# Patient Record
Sex: MALE | Race: WHITE | NOT HISPANIC OR LATINO | Employment: OTHER | URBAN - METROPOLITAN AREA
[De-identification: names, ages, dates, MRNs, and addresses within clinical notes are randomized per-mention and may not be internally consistent; named-entity substitution may affect disease eponyms.]

---

## 2017-01-31 ENCOUNTER — GENERIC CONVERSION - ENCOUNTER (OUTPATIENT)
Dept: OTHER | Facility: OTHER | Age: 82
End: 2017-01-31

## 2017-02-22 ENCOUNTER — ALLSCRIPTS OFFICE VISIT (OUTPATIENT)
Dept: OTHER | Facility: OTHER | Age: 82
End: 2017-02-22

## 2017-02-22 LAB — HBA1C MFR BLD HPLC: 8 %

## 2017-03-23 ENCOUNTER — GENERIC CONVERSION - ENCOUNTER (OUTPATIENT)
Dept: OTHER | Facility: OTHER | Age: 82
End: 2017-03-23

## 2017-05-24 ENCOUNTER — ALLSCRIPTS OFFICE VISIT (OUTPATIENT)
Dept: OTHER | Facility: OTHER | Age: 82
End: 2017-05-24

## 2017-07-07 ENCOUNTER — GENERIC CONVERSION - ENCOUNTER (OUTPATIENT)
Dept: OTHER | Facility: OTHER | Age: 82
End: 2017-07-07

## 2017-08-04 ENCOUNTER — GENERIC CONVERSION - ENCOUNTER (OUTPATIENT)
Dept: OTHER | Facility: OTHER | Age: 82
End: 2017-08-04

## 2017-08-09 ENCOUNTER — ALLSCRIPTS OFFICE VISIT (OUTPATIENT)
Dept: OTHER | Facility: OTHER | Age: 82
End: 2017-08-09

## 2017-08-09 LAB
GLYCATED HEMOGLOBIN (HISTORICAL): 5.2
GLYCATED HEMOGLOBIN (HISTORICAL): 7.3

## 2017-09-01 DIAGNOSIS — E78.5 HYPERLIPIDEMIA: ICD-10-CM

## 2017-09-01 DIAGNOSIS — E11.65 TYPE 2 DIABETES MELLITUS WITH HYPERGLYCEMIA (HCC): ICD-10-CM

## 2017-09-01 DIAGNOSIS — I10 ESSENTIAL (PRIMARY) HYPERTENSION: ICD-10-CM

## 2017-09-05 ENCOUNTER — HOSPITAL ENCOUNTER (EMERGENCY)
Facility: HOSPITAL | Age: 82
Discharge: HOME | End: 2017-09-06
Attending: EMERGENCY MEDICINE | Admitting: EMERGENCY MEDICINE
Payer: COMMERCIAL

## 2017-09-05 DIAGNOSIS — S09.90XA MINOR HEAD INJURY: ICD-10-CM
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
## 2017-09-06 ENCOUNTER — APPOINTMENT (EMERGENCY)
Dept: CT IMAGING | Facility: HOSPITAL | Age: 82
End: 2017-09-06
Payer: COMMERCIAL

## 2017-09-06 VITALS
DIASTOLIC BLOOD PRESSURE: 63 MMHG | OXYGEN SATURATION: 95 % | HEART RATE: 80 BPM | RESPIRATION RATE: 16 BRPM | WEIGHT: 181.4 LBS | SYSTOLIC BLOOD PRESSURE: 145 MMHG | TEMPERATURE: 98.4 F | BODY MASS INDEX: 26.79 KG/M2

## 2017-09-06 PROCEDURE — 72125 CT NECK SPINE W/O DYE: CPT

## 2017-09-06 PROCEDURE — 99284 EMERGENCY DEPT VISIT MOD MDM: CPT

## 2017-09-06 PROCEDURE — 70450 CT HEAD/BRAIN W/O DYE: CPT

## 2017-09-06 RX ORDER — SULFAMETHOXAZOLE AND TRIMETHOPRIM 800; 160 MG/1; MG/1
1 TABLET ORAL EVERY 12 HOURS SCHEDULED
COMMUNITY
End: 2018-01-15

## 2017-09-06 RX ORDER — ESCITALOPRAM OXALATE 5 MG/1
5 TABLET ORAL DAILY
COMMUNITY

## 2017-09-06 RX ORDER — GABAPENTIN 100 MG/1
100 CAPSULE ORAL 3 TIMES DAILY
COMMUNITY

## 2017-09-06 RX ORDER — LORAZEPAM 0.5 MG/1
0.5 TABLET ORAL DAILY
COMMUNITY

## 2017-09-18 ENCOUNTER — GENERIC CONVERSION - ENCOUNTER (OUTPATIENT)
Dept: OTHER | Facility: OTHER | Age: 82
End: 2017-09-18

## 2017-09-18 ENCOUNTER — ALLSCRIPTS OFFICE VISIT (OUTPATIENT)
Dept: OTHER | Facility: OTHER | Age: 82
End: 2017-09-18

## 2017-09-26 ENCOUNTER — GENERIC CONVERSION - ENCOUNTER (OUTPATIENT)
Dept: OTHER | Facility: OTHER | Age: 82
End: 2017-09-26

## 2017-10-03 ENCOUNTER — GENERIC CONVERSION - ENCOUNTER (OUTPATIENT)
Dept: OTHER | Facility: OTHER | Age: 82
End: 2017-10-03

## 2017-10-19 ENCOUNTER — GENERIC CONVERSION - ENCOUNTER (OUTPATIENT)
Dept: OTHER | Facility: OTHER | Age: 82
End: 2017-10-19

## 2017-11-03 DIAGNOSIS — R31.9 HEMATURIA: ICD-10-CM

## 2017-11-16 ENCOUNTER — GENERIC CONVERSION - ENCOUNTER (OUTPATIENT)
Dept: OTHER | Facility: OTHER | Age: 82
End: 2017-11-16

## 2018-01-13 VITALS
BODY MASS INDEX: 24.11 KG/M2 | DIASTOLIC BLOOD PRESSURE: 58 MMHG | WEIGHT: 168.38 LBS | SYSTOLIC BLOOD PRESSURE: 102 MMHG | HEIGHT: 70 IN

## 2018-01-13 VITALS
DIASTOLIC BLOOD PRESSURE: 60 MMHG | BODY MASS INDEX: 26.72 KG/M2 | WEIGHT: 186.25 LBS | SYSTOLIC BLOOD PRESSURE: 110 MMHG

## 2018-01-14 VITALS — SYSTOLIC BLOOD PRESSURE: 104 MMHG | DIASTOLIC BLOOD PRESSURE: 60 MMHG

## 2018-01-15 ENCOUNTER — APPOINTMENT (EMERGENCY)
Dept: RADIOLOGY | Facility: HOSPITAL | Age: 83
End: 2018-01-15
Payer: COMMERCIAL

## 2018-01-15 ENCOUNTER — APPOINTMENT (EMERGENCY)
Dept: CT IMAGING | Facility: HOSPITAL | Age: 83
End: 2018-01-15
Payer: COMMERCIAL

## 2018-01-15 ENCOUNTER — HOSPITAL ENCOUNTER (EMERGENCY)
Facility: HOSPITAL | Age: 83
Discharge: HOME | End: 2018-01-16
Attending: EMERGENCY MEDICINE | Admitting: EMERGENCY MEDICINE
Payer: COMMERCIAL

## 2018-01-15 DIAGNOSIS — S22.080A T12 COMPRESSION FRACTURE (HCC): ICD-10-CM

## 2018-01-15 DIAGNOSIS — I48.91 ATRIAL FIBRILLATION (HCC): ICD-10-CM

## 2018-01-15 DIAGNOSIS — M54.9 BACK PAIN: Primary | ICD-10-CM

## 2018-01-15 LAB
ALBUMIN SERPL BCP-MCNC: 3.1 G/DL (ref 3.5–5)
ALP SERPL-CCNC: 140 U/L (ref 46–116)
ALT SERPL W P-5'-P-CCNC: 14 U/L (ref 12–78)
ANION GAP SERPL CALCULATED.3IONS-SCNC: 5 MMOL/L (ref 4–13)
AST SERPL W P-5'-P-CCNC: 13 U/L (ref 5–45)
BACTERIA UR QL AUTO: ABNORMAL /HPF
BASOPHILS # BLD AUTO: 0.02 THOUSANDS/ΜL (ref 0–0.1)
BASOPHILS NFR BLD AUTO: 0 % (ref 0–1)
BILIRUB SERPL-MCNC: 1.41 MG/DL (ref 0.2–1)
BILIRUB UR QL STRIP: NEGATIVE
BUN SERPL-MCNC: 12 MG/DL (ref 5–25)
CALCIUM SERPL-MCNC: 9.2 MG/DL (ref 8.3–10.1)
CHLORIDE SERPL-SCNC: 100 MMOL/L (ref 100–108)
CLARITY UR: CLEAR
CO2 SERPL-SCNC: 30 MMOL/L (ref 21–32)
COLOR UR: YELLOW
COLOR, POC: YELLOW
CREAT SERPL-MCNC: 1.13 MG/DL (ref 0.6–1.3)
EOSINOPHIL # BLD AUTO: 0.09 THOUSAND/ΜL (ref 0–0.61)
EOSINOPHIL NFR BLD AUTO: 1 % (ref 0–6)
ERYTHROCYTE [DISTWIDTH] IN BLOOD BY AUTOMATED COUNT: 13.4 % (ref 11.6–15.1)
GFR SERPL CREATININE-BSD FRML MDRD: 56 ML/MIN/1.73SQ M
GLUCOSE SERPL-MCNC: 473 MG/DL (ref 65–140)
GLUCOSE UR STRIP-MCNC: ABNORMAL MG/DL
HCT VFR BLD AUTO: 47 % (ref 36.5–49.3)
HGB BLD-MCNC: 16.7 G/DL (ref 12–17)
HGB UR QL STRIP.AUTO: ABNORMAL
KETONES UR STRIP-MCNC: ABNORMAL MG/DL
LEUKOCYTE ESTERASE UR QL STRIP: ABNORMAL
LYMPHOCYTES # BLD AUTO: 1.09 THOUSANDS/ΜL (ref 0.6–4.47)
LYMPHOCYTES NFR BLD AUTO: 13 % (ref 14–44)
MCH RBC QN AUTO: 30.9 PG (ref 26.8–34.3)
MCHC RBC AUTO-ENTMCNC: 35.5 G/DL (ref 31.4–37.4)
MCV RBC AUTO: 87 FL (ref 82–98)
MONOCYTES # BLD AUTO: 0.52 THOUSAND/ΜL (ref 0.17–1.22)
MONOCYTES NFR BLD AUTO: 6 % (ref 4–12)
NEUTROPHILS # BLD AUTO: 6.55 THOUSANDS/ΜL (ref 1.85–7.62)
NEUTS SEG NFR BLD AUTO: 80 % (ref 43–75)
NITRITE UR QL STRIP: NEGATIVE
NON-SQ EPI CELLS URNS QL MICRO: ABNORMAL /HPF
NRBC BLD AUTO-RTO: 0 /100 WBCS
PH UR STRIP.AUTO: 7.5 [PH] (ref 4.5–8)
PLATELET # BLD AUTO: 132 THOUSANDS/UL (ref 149–390)
PMV BLD AUTO: 11 FL (ref 8.9–12.7)
POTASSIUM SERPL-SCNC: 4.5 MMOL/L (ref 3.5–5.3)
PROT SERPL-MCNC: 7 G/DL (ref 6.4–8.2)
PROT UR STRIP-MCNC: ABNORMAL MG/DL
RBC # BLD AUTO: 5.41 MILLION/UL (ref 3.88–5.62)
RBC #/AREA URNS AUTO: ABNORMAL /HPF
SODIUM SERPL-SCNC: 135 MMOL/L (ref 136–145)
SP GR UR STRIP.AUTO: 1.01 (ref 1–1.03)
SPECIMEN SOURCE: NORMAL
TROPONIN I BLD-MCNC: 0.01 NG/ML (ref 0–0.08)
UROBILINOGEN UR QL STRIP.AUTO: 2 E.U./DL
WBC # BLD AUTO: 8.27 THOUSAND/UL (ref 4.31–10.16)
WBC #/AREA URNS AUTO: ABNORMAL /HPF

## 2018-01-15 PROCEDURE — 93005 ELECTROCARDIOGRAM TRACING: CPT

## 2018-01-15 PROCEDURE — 96360 HYDRATION IV INFUSION INIT: CPT

## 2018-01-15 PROCEDURE — 96361 HYDRATE IV INFUSION ADD-ON: CPT

## 2018-01-15 PROCEDURE — 70450 CT HEAD/BRAIN W/O DYE: CPT

## 2018-01-15 PROCEDURE — 85025 COMPLETE CBC W/AUTO DIFF WBC: CPT | Performed by: EMERGENCY MEDICINE

## 2018-01-15 PROCEDURE — 81002 URINALYSIS NONAUTO W/O SCOPE: CPT | Performed by: EMERGENCY MEDICINE

## 2018-01-15 PROCEDURE — 73502 X-RAY EXAM HIP UNI 2-3 VIEWS: CPT

## 2018-01-15 PROCEDURE — 84484 ASSAY OF TROPONIN QUANT: CPT

## 2018-01-15 PROCEDURE — 72125 CT NECK SPINE W/O DYE: CPT

## 2018-01-15 PROCEDURE — 81001 URINALYSIS AUTO W/SCOPE: CPT

## 2018-01-15 PROCEDURE — 87086 URINE CULTURE/COLONY COUNT: CPT

## 2018-01-15 PROCEDURE — 36415 COLL VENOUS BLD VENIPUNCTURE: CPT | Performed by: EMERGENCY MEDICINE

## 2018-01-15 PROCEDURE — 87077 CULTURE AEROBIC IDENTIFY: CPT

## 2018-01-15 PROCEDURE — 87186 SC STD MICRODIL/AGAR DIL: CPT

## 2018-01-15 PROCEDURE — 72100 X-RAY EXAM L-S SPINE 2/3 VWS: CPT

## 2018-01-15 PROCEDURE — 80053 COMPREHEN METABOLIC PANEL: CPT | Performed by: EMERGENCY MEDICINE

## 2018-01-15 RX ORDER — TRAMADOL HYDROCHLORIDE 50 MG/1
50 TABLET ORAL EVERY 6 HOURS PRN
Qty: 15 TABLET | Refills: 0 | Status: SHIPPED | OUTPATIENT
Start: 2018-01-15 | End: 2018-01-25

## 2018-01-15 RX ORDER — DIPHENOXYLATE HYDROCHLORIDE AND ATROPINE SULFATE 2.5; .025 MG/1; MG/1
1 TABLET ORAL 4 TIMES DAILY PRN
COMMUNITY

## 2018-01-15 RX ORDER — ONDANSETRON 4 MG/1
4 TABLET, FILM COATED ORAL EVERY 6 HOURS PRN
COMMUNITY

## 2018-01-15 RX ORDER — ISOSORBIDE DINITRATE 20 MG/1
20 TABLET ORAL 2 TIMES DAILY
COMMUNITY

## 2018-01-15 RX ORDER — ALBUTEROL SULFATE 2.5 MG/3ML
2.5 SOLUTION RESPIRATORY (INHALATION) EVERY 4 HOURS PRN
COMMUNITY

## 2018-01-15 RX ORDER — BISACODYL 10 MG
10 SUPPOSITORY, RECTAL RECTAL AS NEEDED
COMMUNITY

## 2018-01-15 RX ORDER — LISINOPRIL 10 MG/1
10 TABLET ORAL 2 TIMES DAILY
COMMUNITY

## 2018-01-15 RX ADMIN — SODIUM CHLORIDE 1000 ML: 0.9 INJECTION, SOLUTION INTRAVENOUS at 16:47

## 2018-01-15 NOTE — ED PROVIDER NOTES
History  Chief Complaint   Patient presents with    Back Pain     Patient arrives from nursing home with complaints of back pain  Patient is [place] speaking only but does point to lower back and head stating he has pain there  Pain not from any type of injury according to staff but patient does have a history of falls  51-year-old male from a nursing home with a history of CAD, dementia, subdural hematoma, diabetes, hypertension presents to the emergency department for evaluation of back pain  Nursing staff is uncertain when this started or if there was a fall  Patient does have history of falls but apparently he was in his bed when he started complaining of the back pain  Unfortunately, patient has significant dementia and only speaks [place] so history is unobtainable from the patient  On arrival to the emergency department he does not seem to be in any acute distress but does yell when being moved  He points to his head and his back when asked about pain        History provided by:  EMS personnel and nursing home  History limited by:  Dementia  Back Pain   Location:  Lumbar spine  Quality:  Unable to specify  Pain severity:  Unable to specify  Pain is:  Unable to specify  Onset quality:  Unable to specify  Timing:  Unable to specify  Chronicity:  New  Context comment:  Uncertain of injury  Relieved by:  None tried  Worsened by: Movement  Ineffective treatments:  None tried  Associated symptoms comment:  Unable to provide further symptoms or history  Risk factors: no hx of cancer, not obese and no recent surgery        Prior to Admission Medications   Prescriptions Last Dose Informant Patient Reported? Taking?    Acetaminophen 500 MG   Yes Yes   Sig: Take by mouth every 8 (eight) hours as needed for mild pain   LORazepam (ATIVAN) 0 5 mg tablet   Yes Yes   Sig: Take 0 5 mg by mouth daily     albuterol (2 5 mg/3 mL) 0 083 % nebulizer solution   Yes Yes   Sig: Take 2 5 mg by nebulization every 4 (four) hours as needed for wheezing   bisacodyl (BISAC-EVAC) 10 mg suppository   Yes Yes   Sig: Insert 10 mg into the rectum as needed for constipation   diphenoxylate-atropine (LOMOTIL) 2 5-0 025 mg per tablet   Yes Yes   Sig: Take 1 tablet by mouth 4 (four) times a day as needed for diarrhea   escitalopram (LEXAPRO) 5 mg tablet   Yes Yes   Sig: Take 5 mg by mouth daily   ferrous sulfate 325 (65 Fe) mg tablet   Yes Yes   Sig: Take 325 mg by mouth daily     gabapentin (NEURONTIN) 100 mg capsule   Yes Yes   Sig: Take 100 mg by mouth 3 (three) times a day   glimepiride (AMARYL) 4 mg tablet   Yes Yes   Sig: Take 4 mg by mouth 2 (two) times a day Take after dinner or at bedtime    isosorbide dinitrate (ISORDIL) 20 mg tablet   Yes Yes   Sig: Take 20 mg by mouth 2 (two) times a day   lisinopril (ZESTRIL) 10 mg tablet   Yes Yes   Sig: Take 10 mg by mouth 2 (two) times a day   metoprolol tartrate (LOPRESSOR) 25 mg tablet   Yes Yes   Sig: Take 25 mg by mouth 2 (two) times a day     ondansetron (ZOFRAN) 4 mg tablet   Yes Yes   Sig: Take 4 mg by mouth every 6 (six) hours as needed for nausea or vomiting      Facility-Administered Medications: None       Past Medical History:   Diagnosis Date    BPH (benign prostatic hyperplasia)     CAD (coronary artery disease)     s/p stent    Chronic indwelling Umanzor catheter     [facility] cancer ([facility] )     jan 2013    Dementia     Dementia     Diabetes ([facility] )     Dyslipidemia     Falls     GERD (gastroesophageal reflux disease)     Hyperlipidemia     Hypertension     Subdural hemorrhage (HCC)        Past Surgical History:   Procedure Laterality Date    COLECTOMY      jan 2013       Family History   Problem Relation Age of Onset    Coronary artery disease Other      I have reviewed and agree with the history as documented      Social History   Substance Use Topics    Smoking status: Former Smoker    Smokeless tobacco: Not on file    Alcohol use No        Review of Systems   Unable to perform ROS: Dementia   Musculoskeletal: Positive for back pain  Physical Exam  ED Triage Vitals   Temperature Pulse Respirations Blood Pressure SpO2   01/15/18 1438 01/15/18 1438 01/15/18 1438 01/15/18 1438 01/15/18 1438   98 1 °F (36 7 °C) 82 16 (!) 181/93 95 %      Temp Source Heart Rate Source Patient Position - Orthostatic VS BP Location FiO2 (%)   01/15/18 1438 01/15/18 1438 01/15/18 1715 01/15/18 1715 --   Oral Monitor Lying Right arm       Pain Score       01/15/18 1438       7           Orthostatic Vital Signs  Vitals:    01/15/18 1715 01/15/18 1802 01/15/18 2229 01/16/18 0034   BP: (!) 140/106 140/89 128/80 (!) 171/83   Pulse: (!) 107  100 (!) 118   Patient Position - Orthostatic VS: Lying  Lying Lying       Physical Exam   Constitutional: He appears well-developed and well-nourished  Non-toxic appearance  He does not have a sickly appearance  He does not appear ill  No distress  HENT:   Head: Normocephalic and atraumatic  Right Ear: External ear normal    Left Ear: External ear normal    Mouth/Throat: Oropharynx is clear and moist  Mucous membranes are dry  Eyes: Conjunctivae and EOM are normal  Pupils are equal, round, and reactive to light  No scleral icterus  Neck: Normal range of motion  Neck supple  No spinous process tenderness present  Cardiovascular: Normal rate  An irregularly irregular rhythm present  No murmur heard  Pulmonary/Chest: Effort normal and breath sounds normal    Abdominal: Soft  Bowel sounds are normal  He exhibits no distension and no mass  There is no tenderness  No hernia  Musculoskeletal: He exhibits no edema or deformity  Left hip: He exhibits tenderness  He exhibits normal range of motion, no bony tenderness, no swelling, no crepitus, no deformity and no laceration  Lumbar back: He exhibits tenderness, bony tenderness and pain  He exhibits normal range of motion, no swelling, no edema, no deformity and no laceration  Neurological: He is alert  He has normal strength and normal reflexes  He is disoriented  He displays no tremor  He exhibits normal muscle tone  Skin: Skin is warm and dry  No rash noted  He is not diaphoretic  No erythema  No pallor  Psychiatric: He has a normal mood and affect  Nursing note and vitals reviewed        ED Medications  Medications   sodium chloride 0 9 % bolus 1,000 mL (0 mL Intravenous Stopped 1/15/18 1823)       Diagnostic Studies  Results Reviewed     Procedure Component Value Units Date/Time    Urine culture [[id number]]  (Abnormal)  (Susceptibility) Collected:  01/15/18 1631    Lab Status:  Final result Specimen:  Urine from Urine, Clean Catch Updated:  01/19/18 1531     Urine Culture >100,000 cfu/ml Klebsiella pneumoniae (A)      10,000-19,000 cfu/ml Citrobacter freundii (A)    Susceptibility      Klebsiella pneumoniae     THAI    Ampicillin ($$) <=8 00 ug/ml Resistant    Ampicillin + Sulbactam ($) <=8/4 ug/ml Susceptible    Aztreonam ($$$)  <=8 ug/ml Susceptible    Cefazolin ($) <=8 00 ug/ml Susceptible    Ciprofloxacin ($)  <=1 00 ug/ml Susceptible    Gentamicin ($$) <=4 ug/ml Susceptible    Levofloxacin ($) <=2 00 ug/ml Susceptible    Nitrofurantoin 64 ug/ml Intermediate    Piperacillin + Tazobactam ($$$) <=16 ug/ml Susceptible    Tetracycline <=4 ug/ml Susceptible    Tobramycin ($) <=4 ug/ml Susceptible    Trimethoprim + Sulfamethoxazole ($$$) <=2/38 ug/ml Susceptible               Susceptibility      Citrobacter freundii     THAI    Amoxicillin + Clavulanate <=8/4 ug/ml Resistant    Ampicillin ($$) >16 00 ug/ml Resistant    Ampicillin + Sulbactam ($) <=8/4 ug/ml Resistant    Aztreonam ($$$)  <=8 ug/ml Susceptible    Cefazolin ($) <=8 00 ug/ml Resistant    Cefotaxime ($) <=2 00 ug/ml Susceptible    Ceftazidime ($$) <=1 ug/ml Susceptible    Ceftriaxone ($$) <=8 00 ug/ml Susceptible    Cefuroxime ($$) <=4 ug/ml Resistant    Ciprofloxacin ($)  <=1 00 ug/ml Susceptible    Ertapenem ($$$) <=2 0 ug/ml Susceptible Gentamicin ($$) <=4 ug/ml Susceptible    Imipenem <=4 ug/ml Susceptible    Levofloxacin ($) <=2 00 ug/ml Susceptible    Meropenem ($$) <=4 00 ug/ml Susceptible    Nitrofurantoin <=32 ug/ml Susceptible    Piperacillin + Tazobactam ($$$) <=16 ug/ml Susceptible    Tetracycline <=4 ug/ml Susceptible    Tobramycin ($) <=4 ug/ml Susceptible    Trimethoprim + Sulfamethoxazole ($$$) >2/38 ug/ml Resistant                   Urine Microscopic [[id number]]  (Abnormal) Collected:  01/15/18 1631    Lab Status:  Final result Specimen:  Urine from Urine, Clean Catch Updated:  01/15/18 1720     RBC, UA None Seen /hpf      WBC, UA Innumerable (A) /hpf      Epithelial Cells None Seen /hpf      Bacteria, UA None Seen /hpf     POCT urinalysis dipstick [[id number]]  (Normal) Resulted:  01/15/18 1634    Lab Status:  Final result Specimen:  Urine Updated:  01/15/18 1634     Color, UA yellow    ED Urine Macroscopic [[id number]]  (Abnormal) Collected:  01/15/18 1631    Lab Status:  Final result Specimen:  Urine Updated:  01/15/18 1633     Color, UA Yellow     Clarity, UA Clear     pH, UA 7 5     Leukocytes, UA Trace (A)     Nitrite, UA Negative     Protein, UA Trace (A) mg/dl      Glucose,  (1/4%) (A) mg/dl      Ketones, UA 40 (2+) (A) mg/dl      Urobilinogen, UA 2 0 (A) E U /dl      Bilirubin, UA Negative     Blood, UA Small (A)     Specific [place], UA 1 015    Narrative:       CLINITEK RESULT    Comprehensive metabolic panel [[id number]]  (Abnormal) Collected:  01/15/18 1500    Lab Status:  Final result Specimen:  Blood from Arm, Left Updated:  01/15/18 1524     Sodium 135 (L) mmol/L      Potassium 4 5 mmol/L      Chloride 100 mmol/L      CO2 30 mmol/L      Anion Gap 5 mmol/L      BUN 12 mg/dL      Creatinine 1 13 mg/dL      Glucose 473 (H) mg/dL      Calcium 9 2 mg/dL      AST 13 U/L      ALT 14 U/L      Alkaline Phosphatase 140 (H) U/L      Total Protein 7 0 g/dL      Albumin 3 1 (L) g/dL      Total Bilirubin 1 41 (H) mg/dL      eGFR 56 ml/min/1 73sq m     Narrative:         National Kidney Disease Education Program recommendations are as follows:  GFR calculation is accurate only with a steady state creatinine  Chronic Kidney disease less than 60 ml/min/1 73 sq  meters  Kidney failure less than 15 ml/min/1 73 sq  meters  POCT troponin [[id number]]  (Normal) Collected:  01/15/18 1508    Lab Status:  Final result Updated:  01/15/18 1522     POC Troponin I 0 01 ng/ml      Specimen Type VENOUS    Narrative:         [employer] i-Stat handheld analyzer 99% cutoff is > 0 08ng/mL in [facility] Emergency Departments    o cTnI 99% cutoff is useful only when applied to patients in the clinical setting of myocardial ischemia  o cTnI 99% cutoff should be interpreted in the context of clinical history, ECG findings and possibly cardiac imaging to establish correct diagnosis  o cTnI 99% cutoff may be suggestive but clearly not indicative of a coronary event without the clinical setting of myocardial ischemia  CBC and differential [[id number]]  (Abnormal) Collected:  01/15/18 1500    Lab Status:  Final result Specimen:  Blood from Arm, Left Updated:  01/15/18 1508     WBC 8 27 Thousand/uL      RBC 5 41 Million/uL      Hemoglobin 16 7 g/dL      Hematocrit 47 0 %      MCV 87 fL      MCH 30 9 pg      MCHC 35 5 g/dL      RDW 13 4 %      MPV 11 0 fL      Platelets 948 (L) Thousands/uL      nRBC 0 /100 WBCs      Neutrophils Relative 80 (H) %      Lymphocytes Relative 13 (L) %      Monocytes Relative 6 %      Eosinophils Relative 1 %      Basophils Relative 0 %      Neutrophils Absolute 6 55 Thousands/µL      Lymphocytes Absolute 1 09 Thousands/µL      Monocytes Absolute 0 52 Thousand/µL      Eosinophils Absolute 0 09 Thousand/µL      Basophils Absolute 0 02 Thousands/µL                  CT cervical spine without contrast   Final Result by Danette James MD (01/15 1624)      No cervical spine fracture or traumatic malalignment                           Workstation performed: [id number]         CT head without contrast   Final Result by Zachery Lou MD (01/15 0413)   Advanced chronic microvascular ischemic disease      Age-related atrophy      No acute hemorrhage or mass effect      Postoperative changes      Findings are stable         Workstation performed: [id number]         XR hip/pelv 2-3 vws left   Final Result by Tristen Munguia DO (01/15 6087)   No acute osseous abnormality  Consider follow-up MRI if there is strong clinical suspicion of a significant microtrabecular bone injury or occult fracture  Workstation performed: [id number]         XR lumbar spine 2 or 3 views   Final Result by Zachery Lou MD (01/15 9079)   Moderate multilevel degenerative spondylosis with mild interval progression      Diffuse osteopenia      Mild T12 endplate compression, uncertain chronicity, likely chronic      Straightening of normal lordosis suspicious for muscle spasm         Workstation performed: [id number]                    Procedures  Procedures       Phone Contacts  ED Phone Contact    ED Course  ED Course as of Jan 19 1536   Mon Lamine 15, 2018   1635  Spoke with Cardiology regarding atrial fibrillation not previously documented  Given the patient's age and history of frequent falls would only recommend enteric-coated baby aspirin  I did speak with the family and patient's wife states that the family physician had taken the patient off of aspirin  I did recommend that the nursing home speak with the family physician regarding the atrial fibrillation to see if he would like to restart the patient on aspirin  The patient was updated on x-ray reports thus far in the possibility of an age indeterminate compression fracture as the cause of his pain  Urinalysis does show some ketones and patient did look dehydrated so will treat with some IV fluids    He will most likely be discharged back to the nursing home                                MDM  Number of Diagnoses or Management Options  Diagnosis management comments: 54-year-old male presents from a nursing home complaining back pain  Nursing staff is unsure the patient fell  He does have a history of falls but he was found in his bed when he was started to complain of back pain  On arrival patient is awake and in no distress  He does seem to have some pain when moved and points to his back and his head  He also seems to be in atrial fibrillation which is new as there is no record of this on his past charts or records from the nursing home  His mouth is dry  He does have irregular heartbeat on exam   He does have some mild tenderness across the low back but no sign of external trauma  He is holding his left hip in a flexed position but I am able to straighten it out any does not seem to have much pain  Secondary to patient being confused, speaking a foreign language and no definite history from the nursing home, will CT head, C-spine to rule out trauma  Will also x-ray lumbar spine  Will x-ray left hip    Will do basic labs secondary to atrial fibrillation       Amount and/or Complexity of Data Reviewed  Clinical lab tests: ordered and reviewed  Tests in the radiology section of CPT®: ordered and reviewed  Decide to obtain previous medical records or to obtain history from someone other than the patient: yes  Review and summarize past medical records: yes  Independent visualization of images, tracings, or specimens: yes      CritCare Time    Disposition  Final diagnoses:   Back pain   T12 compression fracture ([facility] [address] )   Atrial fibrillation ([facility] [address] )     Time reflects when diagnosis was documented in both MDM as applicable and the Disposition within this note     Time User Action Codes Description Comment    1/15/2018  4:37 PM Clydene Sago A Add [M54 9] Back pain     1/15/2018  4:37 PM Clydene Sago A Add [S22 080A] T12 compression fracture ([address] )     1/15/2018  4:39 PM Clydene Sago A Add [I48 91] Atrial fibrillation ([facility] [address] ) ED Disposition     ED Disposition Condition Comment    Discharge  [facility] discharge to home/self care  Condition at discharge: Good        Follow-up Information     Follow up With Specialties Details Why Contact Info    Tyson Bearden DO Family Medicine Schedule an appointment as soon as possible for a visit in 2 days  [id number]    [address] [facility]  [phone]          Discharge Medication List as of 1/15/2018  4:39 PM      START taking these medications    Details   traMADol (ULTRAM) 50 mg tablet Take 1 tablet by mouth every 6 (six) hours as needed for moderate pain for up to 10 days, Starting Mon 1/15/2018, Until Thu 1/25/2018, Print         CONTINUE these medications which have NOT CHANGED    Details   Acetaminophen 500 MG Take by mouth every 8 (eight) hours as needed for mild pain, Historical Med      albuterol (2 5 mg/3 mL) 0 083 % nebulizer solution Take 2 5 mg by nebulization every 4 (four) hours as needed for wheezing, Historical Med      bisacodyl (BISAC-EVAC) 10 mg suppository Insert 10 mg into the rectum as needed for constipation, Historical Med      diphenoxylate-atropine (LOMOTIL) 2 5-0 025 mg per tablet Take 1 tablet by mouth 4 (four) times a day as needed for diarrhea, Historical Med      escitalopram (LEXAPRO) 5 mg tablet Take 5 mg by mouth daily, Historical Med      ferrous sulfate 325 (65 Fe) mg tablet Take 325 mg by mouth daily  , Historical Med      gabapentin (NEURONTIN) 100 mg capsule Take 100 mg by mouth 3 (three) times a day, Historical Med      glimepiride (AMARYL) 4 mg tablet Take 4 mg by mouth 2 (two) times a day Take after dinner or at bedtime , Historical Med      isosorbide dinitrate (ISORDIL) 20 mg tablet Take 20 mg by mouth 2 (two) times a day, Historical Med      lisinopril (ZESTRIL) 10 mg tablet Take 10 mg by mouth 2 (two) times a day, Historical Med      LORazepam (ATIVAN) 0 5 mg tablet Take 0 5 mg by mouth daily  , Historical Med metoprolol tartrate (LOPRESSOR) 25 mg tablet Take 25 mg by mouth 2 (two) times a day  , Until Discontinued, Historical Med      ondansetron (ZOFRAN) 4 mg tablet Take 4 mg by mouth every 6 (six) hours as needed for nausea or vomiting, Historical Med           No discharge procedures on file      ED Provider  Electronically Signed by           Naif Mason DO  01/19/18 5946

## 2018-01-15 NOTE — ED PROCEDURE NOTE
PROCEDURE  ECG 12 Lead Documentation  Date/Time: 1/15/2018 2:50 PM  Performed by: Cosme Oliva  Authorized by: Cosme Oliva     ECG reviewed by me, the ED Provider: yes    Patient location:  ED  Interpretation:     Interpretation: abnormal    Rate:     ECG rate assessment: normal    Rhythm:     Rhythm: atrial fibrillation    Ectopy:     Ectopy: PVCs      PVCs:  Infrequent  Conduction:     Conduction: abnormal      Abnormal conduction: complete RBBB    ST segments:     ST segments:  Normal  T waves:     T waves: normal           Brenden Sun DO  01/15/18 1451

## 2018-01-15 NOTE — ED NOTES
Pt continues to bend arm occluding IV infusion, unable to follow verbal commands, arm board applied at this time        Chuck Leal RN  01/15/18 1800

## 2018-01-15 NOTE — ED NOTES
Pt extremely difficult to understand due to mumbled speech and language barrier, pt poor historian with vague complaints        Harjinder Lynn, RN  01/15/18 0891

## 2018-01-15 NOTE — DISCHARGE INSTRUCTIONS
A-fib (Atrial Fibrillation)   WHAT YOU NEED TO KNOW:   A-fib may come and go, or it may be a long-term condition  A-fib can cause blood clots, stroke, or heart failure  These conditions may become life-threatening  It is important to treat and manage a-fib to help prevent a blood clot, stroke, or heart failure  DISCHARGE INSTRUCTIONS:   Call 911 for any of the following:   · You have any of the following signs of a heart attack:      ¨ Squeezing, pressure, or pain in your chest that lasts longer than 5 minutes or returns    ¨ Discomfort or pain in your back, neck, jaw, stomach, or arm     ¨ Trouble breathing    ¨ Nausea or vomiting    ¨ Lightheadedness or a sudden cold sweat, especially with chest pain or trouble breathing    · You have any of the following signs of a stroke:      ¨ Numbness or drooping on one side of your face     ¨ Weakness in an arm or leg    ¨ Confusion or difficulty speaking    ¨ Dizziness, a severe headache, or vision loss  Return to the emergency department if:  You have any of the following signs of a blood clot:  · You feel lightheaded, are short of breath, and have chest pain  · You cough up blood  · You have swelling, redness, pain, or warmth in your arm or leg  Contact your cardiologist or healthcare provider if:   · Your target heart rate is not in the range it should be  · You have new or worsening swelling in your legs, feet, ankles, or abdomen  · You are short of breath, even at rest      · You have questions or concerns about your condition or care  Medicines: You may need any of the following:  · Heart medicines  help control your heart rate and rhythm  You may need more than one medicine to treat your symptoms  · Blood thinners    help prevent blood clots  Examples of blood thinners include heparin and warfarin  Clots can cause strokes, heart attacks, and death   The following are general safety guidelines to follow while you are taking a blood thinner:    ¨ Watch for bleeding and bruising while you take blood thinners  Watch for bleeding from your gums or nose  Watch for blood in your urine and bowel movements  Use a soft washcloth on your skin, and a soft toothbrush to brush your teeth  This can keep your skin and gums from bleeding  If you shave, use an electric shaver  Do not play contact sports  ¨ Tell your dentist and other healthcare providers that you take anticoagulants  Wear a bracelet or necklace that says you take this medicine  ¨ Do not start or stop any medicines unless your healthcare provider tells you to  Many medicines cannot be used with blood thinners  ¨ Tell your healthcare provider right away if you forget to take the medicine, or if you take too much  ¨ Warfarin  is a blood thinner that you may need to take  The following are things you should be aware of if you take warfarin  § Foods and medicines can affect the amount of warfarin in your blood  Do not make major changes to your diet while you take warfarin  Warfarin works best when you eat about the same amount of vitamin K every day  Vitamin K is found in green leafy vegetables and certain other foods  Ask for more information about what to eat when you are taking warfarin  § You will need to see your healthcare provider for follow-up visits when you are on warfarin  You will need regular blood tests  These tests are used to decide how much medicine you need  · Antiplatelets , such as aspirin, help prevent blood clots  Take your antiplatelet medicine exactly as directed  These medicines make it more likely for you to bleed or bruise  If you are told to take aspirin, do not take acetaminophen or ibuprofen instead  · Take your medicine as directed  Contact your healthcare provider if you think your medicine is not helping or if you have side effects  Tell him or her if you are allergic to any medicine   Keep a list of the medicines, vitamins, and herbs you take  Include the amounts, and when and why you take them  Bring the list or the pill bottles to follow-up visits  Carry your medicine list with you in case of an emergency  Follow up with your cardiologist as directed: You will need regular blood tests and monitoring  Write down your questions so you remember to ask them during your visits  Manage A-fib:   · Know your target heart rate  Learn how to take your pulse and monitor your heart rate  · Manage other health conditions  This includes high blood pressure, sleep apnea, thyroid disease, diabetes, and other heart conditions  Take medicine as directed and follow your treatment plan  · Limit or do not drink alcohol  Alcohol can make a-fib hard to manage  Ask your healthcare provider if it is safe for you to drink alcohol  A drink of alcohol is 12 ounces of beer, 5 ounces of wine, or 1½ ounces of liquor  · Do not smoke  Nicotine and other chemicals in cigarettes and cigars can cause heart and lung damage  Ask your healthcare provider for information if you currently smoke and need help to quit  E-cigarettes or smokeless tobacco still contain nicotine  Talk to your healthcare provider before you use these products  · Eat heart-healthy foods  Heart healthy foods will help keep your cholesterol low  These include fruits, vegetables, whole-grain breads, low-fat dairy products, beans, lean meats, and fish  Replace butter and margarine with heart-healthy oils such as olive oil and canola oil  · Maintain a healthy weight  Ask your healthcare provider how much you should weigh  Ask him to help you create a weight loss plan if you are overweight  · Exercise for 30 minutes  most days of the week  Ask your healthcare provider about the best exercise plan for you  © 2017 [employer] Information is for End User's use only and may not be sold, redistributed or otherwise used for commercial purposes   All illustrations and images included in CareNotes® are the copyrighted property of [employer]  or [employer]  The above information is an  only  It is not intended as medical advice for individual conditions or treatments  Talk to your doctor, nurse or pharmacist before following any medical regimen to see if it is safe and effective for you  Back Pain   WHAT YOU NEED TO KNOW:   Back pain is common  It can be caused by many conditions, such as arthritis or the breakdown of spinal discs  Your risk for back pain is increased by injuries, lack of activity, or repeated bending and twisting  You may feel sore or stiff on one or both sides of your back  The pain may spread to your buttocks or thighs  DISCHARGE INSTRUCTIONS:   Medicines:   · NSAIDs  help decrease swelling and pain  This medicine is available with or without a doctor's order  NSAIDs can cause stomach bleeding or kidney problems in certain people  If you take blood thinner medicine, always ask your healthcare provider if NSAIDs are safe for you  Always read the medicine label and follow directions  · Acetaminophen  decreases pain  It is available without a doctor's order  Ask how much to take and how often to take it  Follow directions  Acetaminophen can cause liver damage if not taken correctly  · Prescription pain medicine  may be given  Ask your healthcare provider how to take this medicine safely  · Take your medicine as directed  Contact your healthcare provider if you think your medicine is not helping or if you have side effects  Tell him or her if you are allergic to any medicine  Keep a list of the medicines, vitamins, and herbs you take  Include the amounts, and when and why you take them  Bring the list or the pill bottles to follow-up visits  Carry your medicine list with you in case of an emergency    Follow up with your healthcare provider in 2 weeks, or as directed:  Write down your questions so you remember to ask them during your visits  How to manage your back pain:   · Apply ice  on your back or affected area for 15 to 20 minutes every hour or as directed  Use an ice pack, or put crushed ice in a plastic bag  Cover it with a towel  Ice helps prevent tissue damage and decreases pain  · Apply heat  on your back or affected area for 20 to 30 minutes every 2 hours for as many days as directed  Heat helps decrease pain and muscle spasms  · Stay active  as much as you can without causing more pain  Bed rest could make your back pain worse  Avoid heavy lifting until your pain is gone  Return to the emergency department if:   · You have pain, numbness, or weakness in one or both legs  · Your pain becomes so severe that you cannot walk  · You cannot control your urine or bowel movements  · You have severe back pain with chest pain  · You have severe back pain, nausea, and vomiting  · You have severe back pain that spreads to your side or genital area  Contact your healthcare provider if:   · You have back pain that does not get better with rest and pain medicine  · You have a fever  · You have pain that worsens when you are on your back or when you rest     · You have pain that worsens when you cough or sneeze  · You lose weight without trying  · You have questions or concerns about your condition or care  © 2017 [address]  Information is for End User's use only and may not be sold, redistributed or otherwise used for commercial purposes  All illustrations and images included in CareNotes® are the copyrighted property of [employer]  or [address]  The above information is an  only  It is not intended as medical advice for individual conditions or treatments  Talk to your doctor, nurse or pharmacist before following any medical regimen to see if it is safe and effective for you      Vertebral Compression Fracture   WHAT YOU NEED TO KNOW:   A vertebral compression fracture (VCF) is a break in a part of the vertebra  Vertebrae are the round, strong bones that form your spine  VCFs most often occur in the thoracic (middle) and lumbar (lower) areas of your spine  Fractures may be mild to severe  DISCHARGE INSTRUCTIONS:   Medicines: You may need any of the following:  · NSAIDs , such as ibuprofen, help decrease swelling, pain, and fever  This medicine is available with or without a doctor's order  NSAIDs can cause stomach bleeding or kidney problems in certain people  If you take blood thinner medicine, always ask if NSAIDs are safe for you  Always read the medicine label and follow directions  Do not give these medicines to children under 10months of age without direction from your child's healthcare provider  · Acetaminophen  decreases pain and fever  It is available without a doctor's order  Ask how much to take and how often to take it  Follow directions  Acetaminophen can cause liver damage if not taken correctly  · Prescription pain medicine  may be given  Ask your healthcare provider how to take this medicine safely  · Bisphosphonates and calcitonin  may be recommended to help your bones get stronger  They can decrease the pain of a VCF caused by osteoporosis, and decrease your risk for another fracture  · Take your medicine as directed  Contact your healthcare provider if you think your medicine is not helping or if you have side effects  Tell him or her if you are allergic to any medicine  Keep a list of the medicines, vitamins, and herbs you take  Include the amounts, and when and why you take them  Bring the list or the pill bottles to follow-up visits  Carry your medicine list with you in case of an emergency  Follow up with your healthcare provider as directed: You may need to return for x-rays or other tests  Write down your questions so you remember to ask them during your visits     Heat and ice:   · Apply ice  on your back for 15 to 20 minutes every hour or as directed  Use an ice pack, or put crushed ice in a plastic bag  Cover it with a towel  Ice helps prevent tissue damage and decreases swelling and pain  · Apply heat  on your back for 20 to 30 minutes every 2 hours for as many days as directed  Heat helps decrease pain and muscle spasms  Activity:   · Avoid activities that may make the pain worse, such as picking up heavy objects  When the pain decreases, begin normal, slow movements as directed by your healthcare provider  Your healthcare provider may have you do weight-bearing exercises such as walking  You may also do non-weight-bearing exercises such as swimming and bicycling  · You may need to use a walker or cane  Ask your healthcare provider for more information about how to use a cane or a walker  · When you  objects, bend at the hips and knees  Never bend from the waist only  Use bent knees and your leg muscles as you lift the object  While you lift the object, keep it close to your chest  Try not to twist or lift anything above your waist   Physical and occupational therapy:  Your healthcare provider may recommend physical and occupational therapy  A physical therapist teaches you exercises to help improve movement and strength, and to decrease pain  An occupational therapist teaches you skills to help with your daily activities  Manage pain during sleep:   · Do not sleep on a waterbed  Waterbeds do not provide good back support  · Sleep on a firm mattress  You may also put a ½ to 1-inch piece of plywood between the mattress and box spring  · Sleep on your back with a pillow under your knees  This will decrease pressure on your back  You may also sleep on your side with 1 or both of your knees bent and a pillow between them  It may also be helpful to sleep on your stomach with a pillow under you at waist level    Contact your healthcare provider if:   · You are not hungry, and you are losing weight  · You cannot sleep or rest because of back pain  · You have pain or swelling in your back that is getting worse, or does not go away  · You have questions or concerns about your condition or care  Return to the emergency department if:   · You feel lightheaded, short of breath, and have chest pain  · You cough up blood  · Your arm or leg feels warm, tender, and painful  It may look swollen and red  · You have new problems urinating or having bowel movements  · You have severe pain in your back after falling, bending forward, sneezing, or coughing strongly  · You suddenly cannot feel your legs  · You suddenly have trouble moving your arms or legs  © 2017 [address] [employer] Information is for End User's use only and may not be sold, redistributed or otherwise used for commercial purposes  All illustrations and images included in CareNotes® are the copyrighted property of [employer]  or [employer]  The above information is an  only  It is not intended as medical advice for individual conditions or treatments  Talk to your doctor, nurse or pharmacist before following any medical regimen to see if it is safe and effective for you

## 2018-01-15 NOTE — ED NOTES
serosanguinous drainage noted to pts brief, drainage gauze applied over insertion site        Dani Higgins RN  01/15/18 2014

## 2018-01-16 VITALS
HEART RATE: 118 BPM | RESPIRATION RATE: 18 BRPM | OXYGEN SATURATION: 92 % | SYSTOLIC BLOOD PRESSURE: 171 MMHG | BODY MASS INDEX: 24.55 KG/M2 | WEIGHT: 171.08 LBS | DIASTOLIC BLOOD PRESSURE: 83 MMHG | TEMPERATURE: 98.1 F

## 2018-01-16 LAB
ATRIAL RATE: 120 BPM
QRS AXIS: -82 DEGREES
QRSD INTERVAL: 134 MS
QT INTERVAL: 372 MS
QTC INTERVAL: 484 MS
T WAVE AXIS: 55 DEGREES
VENTRICULAR RATE: 102 BPM

## 2018-01-16 PROCEDURE — 99285 EMERGENCY DEPT VISIT HI MDM: CPT

## 2018-01-16 NOTE — ED NOTES
Pt asleep, wakes to verbal stimuli, no complaints offered at this time  Lights dimmed for pt yuriy Barney RN  01/15/18 [id number]

## 2018-01-16 NOTE — ED NOTES
Pt resting quietly, provided additional blanket to protect knee from side rail  Offered dinner tray, pt refused        Idris Carmen, MOJGAN  01/15/18 [id number]

## 2018-01-16 NOTE — ED NOTES
Pt provided meal tray, reporting that he is not hungry at this time        Lucía Briones, MOJGAN  01/15/18 1919

## 2018-01-16 NOTE — ED NOTES
Discharge paperwork and report provided to EMS crew and pt prepared for transport to [facility]       Marina Shah, RN  01/16/18 [id number]

## 2018-01-16 NOTE — ED NOTES
Pt sleeping, respirations even and unlabored, no apparent distress       David Calderon RN  01/15/18 2015

## 2018-01-16 NOTE — PROGRESS NOTES
History of Present Illness  Care Coordination Encounter Information:   Type of Encounter: Telephonic   Contact: Initial Contact   Last Office Visit: 2/23/16   Spoke to Spouse and Other   Wife - [place]  Care Coordination  Nurse Pippa Perry:   The reason for call is to discuss coordination of meeting care plan treatment goals  Patient called today for follow up discharged on 3/2/16 from [facility] for suprapubic lilly possible UTI and influenza  Discharged to SNF to [facility]  Spoke with wife Sherren Fleming today aware I was calling for follow up  I was able to assist in scheduling appointment for patient and his wife on 4/7/16 at 10:00 am with Dr Silvio Whitaker  Currently there is a visiting nurse at the house "Jeannie Schneider" from [facility]  I have provided wife with my contact information to call with any questions or concerns  Active Problems    1  Acid reflux (530 81) (K21 9)   2  Anemia (285 9) (D64 9)   3  Anxiety (300 00) (F41 9)   4  Atherosclerotic heart disease of native coronary artery without angina pectoris (414 01)   (I25 10)   5  Benign essential hypertension (401 1) (I10)   6  Benign prostatic hypertrophy (600 00) (N40 0)   7  Bilateral impacted cerumen (380 4) (H61 23)   8  Bladder disorder (596 9) (N32 9)   9  Cancer, colon (153 9) (C18 9)   10  Chronic constipation (564 00) (K59 00)   11  Cough (786 2) (R05)   12  Depression (311) (F32 9)   13  Diverticulitis of colon (562 11) (K57 32)   14  Edema (782 3) (R60 9)   15  Feeling weak (780 79) (R53 1)   16  Fever (780 60) (R50 9)   17  Hearing loss (389 9) (H91 90)   18  Hyperlipidemia (272 4) (E78 5)   19  Irritable bowel syndrome (564 1) (K58 9)   20  Left arm numbness (782 0) (R20 0)   21  Left leg numbness (782 0) (R20 8)   22  Long-term insulin use (V58 67) (Z79 4)   23  Lower back pain (724 2) (M54 5)   24  Macular degeneration (362 50) (H35 30)   25  Microalbuminuria (791 0) (R80 9)   26   Mild cognitive impairment (199 83) (G39 84) 27  Need for influenza vaccination (V04 81) (Z23)   28  Transient ischemic attack (435 9) (G45 9)   29  Type 2 diabetes mellitus with hyperglycemia (250 00) (E11 65)   30  Vitamin D deficiency (268 9) (E55 9)    Past Medical History    1  History of Acute gastritis (535 00) (K29 00)   2  History of Glaucoma screening (V80 1) (Z13 5)   3  History of Groin (Inguinal) Pain (789 09)   4  History of Need for influenza vaccination (V04 81) (Z23)   5  History of Screening for prostate cancer (V76 44) (Z12 5)   6  History of Urinary tract infection (599 0) (N39 0)    Surgical History    1  History of Appendectomy   2  History of Cardiac Cath Lesion 1, 1st Adjunct Treat Device:   3  History of Colonoscopy (Fiberoptic)   4  History of Complete Colonoscopy   5  History of Diagnostic Esophagogastroduodenoscopy    Family History    1  No pertinent family history    Social History    · Being A Social Drinker   · Former smoker (F93 71) (M11 552)    Current Meds    1  Omeprazole 20 MG Oral Capsule Delayed Release; take 1 capsule by mouth every   morning BEFORE BREAKFAST; Therapy: 16UVY9357 to (Evaluate:31Zrn3887)  Requested for: 31EDQ0235; Last   Rx:25Jan2016 Ordered    2  Potassium Chloride Kira ER 20 MEQ Oral Tablet Extended Release; Therapy: (Recorded:12Tdy2640) to Recorded    3  Metoprolol Tartrate 25 MG Oral Tablet; take 1 tablet by mouth twice a day; Therapy: 67Blc4236 to (Evaluate:21Ioe4451)  Requested for: 36GUJ7405; Last   Rx:96Dps5226 Ordered    4  AmLODIPine Besylate 5 MG Oral Tablet; take 1 tablet by mouth twice a day; Therapy: 02VJB5932 to (Evaluate:59Ccs6405)  Requested for: 88ARN9397; Last   Rx:49Bsr4460 Ordered   5  Finasteride 5 MG Oral Tablet; take 1 tablet by mouth once daily; Therapy: 84JRF5856 to (Evaluate:19Mar2016)  Requested for: 69GLM1067; Last   Rx:25Mar2015 Ordered   6  Lisinopril 20 MG Oral Tablet; take 1 tablet by mouth once daily;    Therapy: 00WLN2988 to (Evaluate:36Kzo5225)  Requested for: 24JIE7372; Last   Rx:50Xqh2105 Ordered    7  Azithromycin 250 MG Oral Tablet; TAKE 2 TABLETS ON DAY 1 THEN TAKE 1 TABLET A   DAY FOR 4 DAYS; Therapy: 02RRM4980 to (Last Rx:14Avf9287)  Requested for: 34Jbu2897 Ordered    8  Simvastatin 40 MG Oral Tablet; take 1 tablet by mouth daily; Therapy: 67VNM1641 to (Vivian Olya)  Requested for: 40RQI9556; Last   Rx:11Ifk3123 Ordered    9  TraMADol HCl - 50 MG Oral Tablet; TAKE 0 5 TABLET Twice daily PRN; Therapy: 41Utv4123 to (Evaluate:03Sep2015)  Requested for: 51RMO6251; Last   Rx:05Jun2015 Ordered    10  Francois Contour Test In [employer]; USE 1 STRIP Daily; Therapy: 48TKP6267 to (Mercedes France)  Requested for: 20TSM9105; Last    Rx:29Oct2015 Ordered   11  BD Pen Needle Mini U/F 31G X 5 MM Miscellaneous; USE 1 PEN NEEDLE TO TEST    TWICE A DAY AS DIRECTED; Therapy: 92JAF6783 to (Last Rx:03Oct2014)  Requested for: 11UYS6777 Ordered   12  Glimepiride 4 MG Oral Tablet; take 2 tablets by mouth once daily; Therapy: 79QGU9090 to (Evaluate:12Nov2016)  Requested for: 77QHD7782; Last    Rx:18Nov2015 Ordered   13  Janumet  MG Oral Tablet; samples given x 112 (1 bid0    G146 Feb 2012; Therapy: 35FEN6707 to (Evaluate:08Jan2014)  Requested for: 86HUN0528; Last    Rx:21Okq5038 Ordered   14  Lantus SoloStar 100 UNIT/ML Subcutaneous Solution Pen-injector; INJECT 6-10 UNIT    Daily; Therapy: 56HSH5520 to (Last Rx:15Jan2016)  Requested for: 21IEY2336 Ordered    15  Ferrous Sulfate 220 (44 Fe) MG/5ML Oral Elixir; Therapy: (Recorded:12Qgg0338) to Recorded   16  Vitamin C 500 MG Oral Tablet; Therapy: (Recorded:49Zcv8627) to Recorded    Allergies    1  No Known Drug Allergies    Health Management   (1) HEMOGLOBIN A1C; every 6 months; Last 12VFA4889; Next Due: [id number]; Active  (1) MICROALBUMIN CREATININE RATIO, RANDOM URINE; every 1 year; Last 40VJO0007; Next Due:  20Nov2016; Active  *VB - Eye Exam; every 1 year; Last 31KHP5666;  Next Due: 59ZCU3183; Active  *VB-Foot Exam; every 1 year; Last 34QBD9805; Next Due: 32Kol9803; Active   Medicare Annual Wellness Visit; every 1 year; Next Due: 64Xar6858; Overdue    End of Encounter Meds    1  Omeprazole 20 MG Oral Capsule Delayed Release; take 1 capsule by mouth every   morning BEFORE BREAKFAST; Therapy: 22ZVB9578 to (Evaluate:01Qoc8262)  Requested for: 36RYH4925; Last   Rx:25Jan2016 Ordered    2  Potassium Chloride Kira ER 20 MEQ Oral Tablet Extended Release; Therapy: (Recorded:57Kks1281) to Recorded    3  Metoprolol Tartrate 25 MG Oral Tablet; take 1 tablet by mouth twice a day; Therapy: 61Vdp8800 to (Evaluate:27Pmh3858)  Requested for: 64AYT4646; Last   Rx:18Qyd2691 Ordered    4  AmLODIPine Besylate 5 MG Oral Tablet; take 1 tablet by mouth twice a day; Therapy: 38ABH8521 to (Evaluate:19Gyl9405)  Requested for: 93DMU8824; Last   Rx:18Nov2015 Ordered   5  Finasteride 5 MG Oral Tablet; take 1 tablet by mouth once daily; Therapy: 84KWA9010 to (Evaluate:19Mar2016)  Requested for: 71MYN2179; Last   Rx:25Mar2015 Ordered   6  Lisinopril 20 MG Oral Tablet; take 1 tablet by mouth once daily; Therapy: 17LVA0015 to (Evaluate:76Qvc8071)  Requested for: 97UYY7554; Last   Rx:93Mvf7406 Ordered    7  Azithromycin 250 MG Oral Tablet; TAKE 2 TABLETS ON DAY 1 THEN TAKE 1 TABLET A   DAY FOR 4 DAYS; Therapy: 92DCI9761 to (Last Rx:30Nce9703)  Requested for: 33Gue5824 Ordered    8  Simvastatin 40 MG Oral Tablet; take 1 tablet by mouth daily; Therapy: 46STH1026 to (Francisco Escudero)  Requested for: 04RSE7741; Last   Rx:98Adw4013 Ordered    9  TraMADol HCl - 50 MG Oral Tablet; TAKE 0 5 TABLET Twice daily PRN; Therapy: 83Trk0510 to (Evaluate:86Phb3362)  Requested for: 73YVE5332; Last   Rx:05Jun2015 Ordered    10  Francois Contour Test In [employer]; USE 1 STRIP Daily; Therapy: 49JMR5512 to (Janne Sacks)  Requested for: 53FGY7025; Last    Rx:29Oct2015 Ordered   11   BD Pen Needle Mini U/F 31G X 5 MM Miscellaneous; USE 1 PEN NEEDLE TO TEST    TWICE A DAY AS DIRECTED; Therapy: 64CJC3197 to (Last Rx:03Oct2014)  Requested for: 31UKW8030 Ordered   12  Glimepiride 4 MG Oral Tablet; take 2 tablets by mouth once daily; Therapy: 28ACI4618 to (Evaluate:12Nov2016)  Requested for: 07WQN6151; Last    Rx:18Nov2015 Ordered   13  Janumet  MG Oral Tablet; samples given x 112 (1 bid0    G146 Feb 2012; Therapy: 63MAM8726 to (Evaluate:08Jan2014)  Requested for: 19SHC8111; Last    Rx:87Kuv5166 Ordered   14  Lantus SoloStar 100 UNIT/ML Subcutaneous Solution Pen-injector; INJECT 6-10 UNIT    Daily; Therapy: 57DHU8158 to (Last Rx:15Jan2016)  Requested for: 51AZE7741 Ordered    15  Ferrous Sulfate 220 (44 Fe) MG/5ML Oral Elixir; Therapy: (Recorded:91Xel9313) to Recorded   16  Vitamin C 500 MG Oral Tablet;     Therapy: (Recorded:59Whx9271) to Recorded    Future Appointments    Date/Time Provider Specialty Site   49[facility]59[facility]7378 26[facility] [address] [place] [address]   82/24/6898 11:79 PM [facility]     Signatures   Electronically signed by : Lizabeth Min RN; Mar 24 2016 12:10PM EST                       (Author)

## 2018-01-17 LAB
BACTERIA UR CULT: ABNORMAL
BACTERIA UR CULT: ABNORMAL

## 2018-01-17 NOTE — MISCELLANEOUS
Assessment    1  Dementia (294 20) (F03 90)   2  Anxiety (300 00) (F41 9)   3  Benign essential hypertension (401 1) (I10)   4  Type 2 diabetes mellitus with hyperglycemia (250 00) (E11 65)   5  Ambulatory dysfunction (719 7) (R26 2)   6  Bladder disorder (596 9) (N32 9)    Discussion/Summary  Discussion Summary:   Dementia and agitation improved after med changes  CPM  BP / amb dysf are stable  Supplies for catheter completed  DM simproved w office a1c of 7 3 down from 8 7  cierra 3 mo  w flu shot  Counseling Documentation With Imm: The patient, patient's family was counseled regarding instructions for management, prognosis, patient and family education, impressions, risks and benefits of treatment options  total time of encounter was 25 minutes and > 50% minutes was spent counseling  Medication SE Review and Pt Understands Tx: Possible side effects of new medications were reviewed with the patient/guardian today  The treatment plan was reviewed with the patient/guardian  The patient/guardian understands and agrees with the treatment plan      Chief Complaint  Chief Complaint Free Text Note Form: pt was at [facility] and now is back at [facility]  Pt and wife have no complaints  History of Present Illness  Dementia (Follow-Up): His dementia has s/p [facility] older adult beh unit admit for agitation  since the last visit  Symptoms: stable memory impairment, improved agitation, stable depression and improved wandering  TCM Communication [facility]: The patient is being contacted for follow-up after hospitalization  Hospital records were reviewed  He was hospitalized at [facility]  The date of admission: 07/17/17, date of discharge: 08/03/2017  Diagnosis: mood disorder  He was discharged to a nursing home, to a long term care facility  Medications reviewed and updated today  He scheduled a follow up appointment  The patient is currently asymptomatic   Counseling was provided to patient's caretaker  Communication performed and completed by olayinka young   HPI: 81 yo wm w known dementia s/p [facility] bc he was wandering at [facility] and became agitated  Admitted  No records here  Doing better  Wife present  Review of Systems  Complete-Male:   Constitutional: as noted in HPI  Eyes: No complaints of eye pain, no red eyes, no discharge from eyes, no itchy eyes  ENT: no complaints of earache, no hearing loss, no nosebleeds, no nasal discharge, no sore throat, no hoarseness  Cardiovascular: No complaints of slow heart rate, no fast heart rate, no chest pain, no palpitations, no leg claudication, no lower extremity  Respiratory: No complaints of shortness of breath, no wheezing, no cough, no SOB on exertion, no orthopnea or PND  Gastrointestinal: No complaints of abdominal pain, no constipation, no nausea or vomiting, no diarrhea or bloody stools  Genitourinary: chronic cath  Musculoskeletal: no arthralgias and no myalgias  Neurological: difficulty walking, but as noted in HPI  Psychiatric: anxiety  Active Problems    1  Acid reflux (530 81) (K21 9)   2  Ambulatory dysfunction (719 7) (R26 2)   3  Anemia (285 9) (D64 9)   4  Anxiety (300 00) (F41 9)   5  Atherosclerotic heart disease of native coronary artery without angina pectoris (414 01)   (I25 10)   6  Benign essential hypertension (401 1) (I10)   7  Benign prostatic hypertrophy (600 00) (N40 0)   8  Bladder disorder (596 9) (N32 9)   9  Chronic constipation (564 00) (K59 09)   10  Dementia (294 20) (F03 90)   11  Depression (311) (F32 9)   12  Edema (782 3) (R60 9)   13  Hearing loss (389 9) (H91 90)   14  Hyperlipidemia (272 4) (E78 5)   15  Irritable bowel syndrome (564 1) (K58 9)   16  Lower back pain (724 2) (M54 5)   17  Macular degeneration (362 50) (H35 30)   18  Microalbuminuria (791 0) (R80 9)   19  Mild cognitive impairment (331 83) (G31 84)   20  Need for influenza vaccination (V04 81) (Z23)   21   Need for pneumococcal vaccination (V03 82) (Z23)   22  Need for Tdap vaccination (V06 1) (Z23)   23  Symptoms involving urinary system (788 99) (R39 9)   24  Type 2 diabetes mellitus with hyperglycemia (250 00) (E11 65)   25  Vitamin D deficiency (268 9) (E55 9)    Past Medical History    1  History of Acute cystitis with hematuria (595 0) (N30 01)   2  History of Acute gastritis (535 00) (K29 00)   3  History of Bilateral impacted cerumen (380 4) (H61 23)   4  History of Cough (786 2) (R05)   5  History of Diabetes mellitus type II, uncontrolled (250 02) (E11 65)   6  History of Disorientation (780 99) (R41 0)   7  History of Glaucoma screening (V80 1) (Z13 5)   8  History of Groin (Inguinal) Pain (789 09)   9  History of diverticulitis of colon (V12 79) (Z87 19)   10  History of fever (V13 89) (Z87 898)   11  History of malignant neoplasm of colon (V10 05) (Z85 038)   12  History of transient cerebral ischemia (V12 54) (Z86 73)   13  History of urinary tract infection (V13 02) (Z87 440)   14  History of urinary tract infection (V13 02) (Z87 440)   15  History of weakness (V13 89) (Z87 898)   16  History of Left arm numbness (782 0) (R20 0)   17  History of Left leg numbness (782 0) (R20 8)   18  History of Long-term insulin use (V58 67) (Z79 4)   19  History of Need for influenza vaccination (V04 81) (Z23)   20  History of Need for influenza vaccination (V04 81) (Z23)   21  History of Rash (782 1) (R21)   22  History of Screening for prostate cancer (V76 44) (Z12 5)    Surgical History    1  History of Appendectomy   2  History of Cardiac Cath Lesion 1, 1st Adjunct Treat Device:   3  History of Colonoscopy (Fiberoptic)   4  History of Complete Colonoscopy   5  History of Diagnostic Esophagogastroduodenoscopy   6  History of Right Hemicolectomy    Family History  Mother    1  No pertinent family history    Social History    · Being A Social Drinker   · Former smoker (F31 55) (I20 387)    Current Meds   1   [employer] In Vitro Strip; USE 1 STRIP Daily; Therapy: 44OYM4394 to (Mayra Martinez)  Requested for: 92NMX7852; Last   Rx:29Oct2015 Ordered   2  BD Pen Needle Mini U/F 31G X 5 MM Miscellaneous; use 1 pen needle to test 2 times   daily; Therapy: 73Mpt5509 to (Last Rx:58Aht9527)  Requested for: 13Vke3791 Ordered   3  BD Pen Needle Mini U/F 31G X 5 MM Miscellaneous; USE 1 PEN NEEDLE TO TEST   TWICE A DAY AS DIRECTED; Therapy: 07BES5036 to (Last Rx:14Rqu4767)  Requested for: 50Cgh0550 Ordered   4  Bisac-Evac 10 MG Rectal Suppository; Therapy: (Recorded:29Lpj2894) to Recorded   5  Ferrous Sulfate 325 (65 Fe) MG Oral Tablet; Take 1 tablet by mouth once daily; Therapy: 07GVE3766 to (Last [facility])  Requested for: 28Jun2017 Ordered   6  Gabapentin 100 MG Oral Capsule; TAKE 1 CAPSULE 3 TIMES DAILY; Therapy: (Raffy Jean Baptiste) to Recorded   7  Glimepiride 4 MG Oral Tablet; Take 1 tablet by mouth once daily; Therapy: 43MEM3510 to (Last [facility])  Requested for: 28Jun2017 Ordered   8  Isosorbide Mononitrate 20 MG Oral Tablet; take 1 tablet by mouth twice daily; Therapy: 14KND4805 to ([facility])  Requested for: 28Jun2017 Ordered   9  LORazepam 0 5 MG Oral Tablet; TAKE 1 TABLET DAILY; Therapy: (Raffy Jean Baptiste) to Recorded   10  Mapap 500 MG Oral Tablet; Therapy: (Recorded:37Ywo7126) to Recorded   11  Metoprolol Tartrate 25 MG Oral Tablet; Take 1 tablet every 12 hours; Therapy: 70Rqp3669 to (Last Rx:74Dol8637)  Requested for: 99RHD1599 Ordered   12  Sodium Chloride 0 9 % Irrigation Solution; Therapy: (Recorded:61Rrj8008) to Recorded   13  Tradjenta 5 MG Oral Tablet; TAKE 1 TABLET DAILY; Therapy: (Raffy Jean Baptiste) to Recorded   14  True[employer] Test In Vitro Strip; USE TO TEST BS ONCE DAILY; Therapy: 39PSE2770 to (Last Rx:59Dra7893)  Requested for: 93Fye3709; Status: ACTIVE    - Retrospective By Protocol Authorization Ordered    Allergies    1   No Known Drug Allergies    Physical Exam    Constitutional   General appearance: No acute distress, well appearing and well nourished  normal body odor, overweight and well groomed  Eyes   Pupils and irises: Equal, round and reactive to light  Ears, Nose, Mouth, and Throat   Oropharynx: Normal with no erythema, edema, exudate or lesions  Pulmonary   Respiratory effort: No increased work of breathing or signs of respiratory distress  Auscultation of lungs: Clear to auscultation, equal breath sounds bilaterally, no wheezes, no rales, no rhonci  Cardiovascular   Auscultation of heart: Normal rate and rhythm, normal S1 and S2, without murmurs  Examination of extremities for edema and/or varicosities: Normal     Carotid pulses: Normal     Abdomen   Abdomen: Non-tender, no masses  Musculoskeletal   Gait and station: Abnormal   amb dysf  Neurologic   Cranial nerves: Cranial nerves 2-12 intact  Psychiatric   Orientation to person, place and time: Abnormal     Mood and affect: Normal   calm  pleasantly lost         Health Management  Type 2 diabetes mellitus with hyperglycemia   (1) HEMOGLOBIN A1C; every 6 months; Last 17Xyr3487; Next Due: 95DNC5870; Overdue  (1) MICROALBUMIN CREATININE RATIO, RANDOM URINE; every 1 year; Last 63WEK8247; Next Due:  54Mbh1051; Overdue  *VB - Eye Exam; every 1 year; Last 50Ffh0197; Next Due: 46Wdp4266; Near Due  *VB - Foot Exam; every 1 year; Last 95Bcd9909; Next Due: 38YSR2729; Active  Health Maintenance   Medicare Annual Wellness Visit; every 1 year; Next Due: 45Agl7477;  Overdue    Future Appointments    Date/Time Provider Specialty Site   38/32/5898 90:41 PM Tony Solomon DO Family [facility]     Signatures   Electronically signed by : Pj Ortega DO; Aug  9 7880  1:50PM EST                       (Author)

## 2018-01-18 NOTE — MISCELLANEOUS
Assessment    1  Acute cystitis with hematuria (595 0) (N30 01)   2  Disorientation (780 99) (R41 0)   3  Mild cognitive impairment (331 83) (G31 84)   4  Diabetes mellitus type II, uncontrolled (250 02) (E11 65)   5  Long-term insulin use (V58 67) (Z79 4)    Plan  Type 2 diabetes mellitus with hyperglycemia    · Janumet  MG Oral Tablet; samples given x 112 (1 bid0  G146 Feb 9584   Rx By: Ritchie Guadarrama; Dispense: 0 Days ; #:28 Tablet; Refill: 0; For: Type 2 diabetes mellitus with hyperglycemia; NGUYEN = N; Dispense Sample; Msg to Pharmacy: [address] [place] [address]  #28 tabs; Last Updated By: Aneesh Barrera; 5/3/2016 12:05:19 PM    Chief Complaint  Chief Complaint Free Text Note Form: Pt is here for a CROW  Pt went in on 04/25/2016 and discharged 04/27/2016 for pain in the abd  History of Present Illness  TCM Communication [facility]: The patient is being contacted for follow-up after hospitalization  He was hospitalized at [facility]  The date of admission: 04/25/2016, date of discharge: 04/27/2016  Diagnosis: ABD pain  He was discharged to home  The patient is currently asymptomatic  Communication performed and completed by Darell Hutchins      Active Problems    1  Acid reflux (530 81) (K21 9)   2  Anemia (285 9) (D64 9)   3  Anxiety (300 00) (F41 9)   4  Atherosclerotic heart disease of native coronary artery without angina pectoris (414 01)   (I25 10)   5  Benign essential hypertension (401 1) (I10)   6  Benign prostatic hypertrophy (600 00) (N40 0)   7  Bilateral impacted cerumen (380 4) (H61 23)   8  Bladder disorder (596 9) (N32 9)   9  Cancer, colon (153 9) (C18 9)   10  Chronic constipation (564 00) (K59 09)   11  Cough (786 2) (R05)   12  Depression (311) (F32 9)   13  Diabetes mellitus type II, uncontrolled (250 02) (E11 65)   14  Diverticulitis of colon (562 11) (K57 32)   15  Edema (782 3) (R60 9)   16  Feeling weak (780 79) (R53 1)   17  Fever (780 60) (R50 9)   18  Hearing loss (389 9) (H91 90)   19  Hyperlipidemia (272 4) (E78 5)   20  Irritable bowel syndrome (564 1) (K58 9)   21  Left arm numbness (782 0) (R20 0)   22  Left leg numbness (782 0) (R20 8)   23  Long-term insulin use (V58 67) (Z79 4)   24  Lower back pain (724 2) (M54 5)   25  Macular degeneration (362 50) (H35 30)   26  Microalbuminuria (791 0) (R80 9)   27  Mild cognitive impairment (331 83) (G31 84)   28  Need for influenza vaccination (V04 81) (Z23)   29  Need for Tdap vaccination (V06 1) (Z23)   30  Symptoms involving urinary system (788 99) (R39 9)   31  Transient ischemic attack (435 9) (G45 9)   32  Type 2 diabetes mellitus with hyperglycemia (250 00) (E11 65)   33  Vitamin D deficiency (268 9) (E55 9)    Past Medical History    1  History of Acute gastritis (535 00) (K29 00)   2  History of Glaucoma screening (V80 1) (Z13 5)   3  History of Groin (Inguinal) Pain (789 09)   4  History of Need for influenza vaccination (V04 81) (Z23)   5  History of Screening for prostate cancer (V76 44) (Z12 5)   6  History of Urinary tract infection (599 0) (N39 0)    Surgical History    1  History of Appendectomy   2  History of Cardiac Cath Lesion 1, 1st Adjunct Treat Device:   3  History of Colonoscopy (Fiberoptic)   4  History of Complete Colonoscopy   5  History of Diagnostic Esophagogastroduodenoscopy    Family History  Mother    1  No pertinent family history    Social History    · Being A Social Drinker   · Former smoker (Y75 85) (C35 393)    Current Meds   1  AmLODIPine Besylate 5 MG Oral Tablet; take 1 tablet by mouth twice a day; Therapy: 71GVD2832 to (Evaluate:19Mep5746)  Requested for: 29XAS7046; Last   Rx:18Nov2015 Ordered   2  Azithromycin 250 MG Oral Tablet; TAKE 2 TABLETS ON DAY 1 THEN TAKE 1 TABLET A   DAY FOR 4 DAYS; Therapy: 40GDA7076 to (Last Rx:93Nny7998)  Requested for: 29Big2815 Ordered   3  Francois Contour Test In [employer]; USE 1 STRIP Daily; Therapy: 69GQH6046 to (Irais Parkinson)  Requested for: 46NMF9386;  Last Rx: 13XHP6161 Ordered   4  BD Pen Needle Mini U/F 31G X 5 MM Miscellaneous; USE 1 PEN NEEDLE TO TEST   TWICE A DAY AS DIRECTED; Therapy: 28JOD4780 to (Last Rx:03Oct2014)  Requested for: 20BKJ1997 Ordered   5  Ferrous Sulfate 220 (44 Fe) MG/5ML Oral Elixir; Therapy: (Recorded:96Evw5226) to Recorded   6  Finasteride 5 MG Oral Tablet; take 1 tablet by mouth once daily; Therapy: 30ZKN6953 to (Evaluate:16Apr2017)  Requested for: 21Apr2016; Last   Rx:21Apr2016; Status: ACTIVE - Retrospective By Protocol Authorization Ordered   7  Glimepiride 4 MG Oral Tablet; take 2 tablets by mouth once daily; Therapy: 66ZXS9620 to (Evaluate:12Nov2016)  Requested for: 92OAG2931; Last   Rx:18Nov2015 Ordered   8  Janumet  MG Oral Tablet; samples given x 112 (1 bid0   G146 Feb 2012; Therapy: 05EBE2757 to (Evaluate:08Jan2014)  Requested for: 12EGB8116; Last   Rx:19Rdi8799 Ordered   9  Lantus SoloStar 100 UNIT/ML Subcutaneous Solution Pen-injector; INJECT 6-10 UNIT   Daily; Therapy: 18GCR9005 to (Last Rx:15Jan2016)  Requested for: 92FEL5914 Ordered   10  Lisinopril 20 MG Oral Tablet; take 1 tablet by mouth once daily; Therapy: 37MYW8332 to (Evaluate:15May2016)  Requested for: 74IOD2289; Last    Rx:04Vhl6583 Ordered   11  Metoprolol Tartrate 25 MG Oral Tablet; take 1 tablet by mouth twice a day; Therapy: 98Gld2423 to (Evaluate:84Uxt2413)  Requested for: 21Apr2016; Last    Rx:21Apr2016; Status: ACTIVE - Retrospective By Protocol Authorization Ordered   12  Omeprazole 20 MG Oral Capsule Delayed Release; take 1 capsule by mouth every    morning BEFORE BREAKFAST; Therapy: 78SKX3363 to (Evaluate:18Feb2017)  Requested for: 09ZSP5191; Last    Rx:25Jan2016 Ordered   13  Potassium Chloride Kira ER 20 MEQ Oral Tablet Extended Release; Therapy: (Recorded:14Zld0679) to Recorded   14  Simvastatin 40 MG Oral Tablet; take 1 tablet by mouth daily;     Therapy: 76MWA7926 to (Evaluate:19Oct2016)  Requested for: 22Apr2016; Last Rx: 22Apr2016; Status: ACTIVE - Retrospective By Protocol Authorization Ordered   15  Sulfamethoxazole-Trimethoprim 800-160 MG Oral Tablet; Take 1 tablet twice daily; Therapy: 04Apr2016 to (Evaluate:14Apr2016)  Requested for: 04Apr2016; Last    Rx:04Apr2016; Status: ACTIVE - Retrospective By Protocol Authorization Ordered   16  TraMADol HCl - 50 MG Oral Tablet; TAKE 0 5 TABLET Twice daily PRN; Therapy: 35Sxc9495 to (Evaluate:00Fkj0103)  Requested for: 61MMC1599; Last    Rx:05Jun2015 Ordered   17  Vitamin C 500 MG Oral Tablet; Therapy: (Recorded:46Pre9804) to Recorded    Allergies    1  No Known Drug Allergies    Vitals  Signs [Data Includes: Current Encounter]   Recorded: 66WZS1390 60:45UO   Systolic: 914  Diastolic: 62  Height: 5 ft 10 in  Weight: 191 lb 6 08 oz  BMI Calculated: 27 46  BSA Calculated: 2 05    Health Management  Type 2 diabetes mellitus with hyperglycemia   (1) HEMOGLOBIN A1C; every 6 months; Last 81HEU5037; Next Due: 94OMA8404; Near Due  (1) MICROALBUMIN CREATININE RATIO, RANDOM URINE; every 1 year; Last 18TEB7154; Next Due:  38Wnw3985; Active  *VB - Eye Exam; every 1 year; Last 60ICE6830; Next Due: 37VDS8633; Active  *VB-Foot Exam; every 1 year; Last 41JLY6877; Next Due: 81Epi2958; Active  Health Maintenance   Medicare Annual Wellness Visit; every 1 year; Next Due: 44Jel0764;  Overdue    Future Appointments    Date/Time Provider Specialty Site   66/33/0615 73:78 AM Carmela Michele DO Family Medicine [facility]     Signatures   Electronically signed by : Tenisha Jewell DO; May  4 8753 12:31PM EST                       (Author)

## 2018-01-19 NOTE — PROGRESS NOTES
Called nursing home, [facility], to inform of +UTI and see if patient was started on antibiotics  Nurse is not available at this time and will call back

## 2018-01-22 VITALS
WEIGHT: 187 LBS | SYSTOLIC BLOOD PRESSURE: 140 MMHG | HEIGHT: 70 IN | DIASTOLIC BLOOD PRESSURE: 62 MMHG | BODY MASS INDEX: 26.77 KG/M2

## 2018-01-22 VITALS
DIASTOLIC BLOOD PRESSURE: 80 MMHG | SYSTOLIC BLOOD PRESSURE: 140 MMHG | WEIGHT: 186 LBS | BODY MASS INDEX: 26.63 KG/M2 | HEIGHT: 70 IN

## 2018-01-22 VITALS — WEIGHT: 186 LBS | SYSTOLIC BLOOD PRESSURE: 124 MMHG | DIASTOLIC BLOOD PRESSURE: 62 MMHG | BODY MASS INDEX: 26.69 KG/M2

## 2018-01-29 RX ORDER — GABAPENTIN 100 MG/1
100 CAPSULE ORAL
COMMUNITY

## 2018-01-29 RX ORDER — TRAMADOL HYDROCHLORIDE 50 MG/1
TABLET ORAL
COMMUNITY

## 2018-01-29 RX ORDER — ACETAMINOPHEN 500 MG
500 TABLET ORAL EVERY 6 HOURS
COMMUNITY
Start: 2016-12-06

## 2018-01-29 RX ORDER — LISINOPRIL 10 MG/1
1 TABLET ORAL 2 TIMES DAILY
COMMUNITY
Start: 2018-01-09

## 2018-01-29 RX ORDER — FINASTERIDE 5 MG/1
5 TABLET, FILM COATED ORAL
COMMUNITY

## 2018-01-29 RX ORDER — BISACODYL 10 MG
SUPPOSITORY, RECTAL RECTAL
COMMUNITY
Start: 2018-01-16

## 2018-01-29 RX ORDER — LANCETS 28 GAUGE
EACH MISCELLANEOUS
COMMUNITY
Start: 2017-12-27

## 2018-01-29 RX ORDER — ESCITALOPRAM OXALATE 5 MG/1
5 TABLET ORAL
COMMUNITY

## 2018-01-29 RX ORDER — AMLODIPINE BESYLATE 5 MG/1
5 TABLET ORAL
COMMUNITY

## 2018-01-29 RX ORDER — LORAZEPAM 0.5 MG/1
1 TABLET ORAL DAILY
COMMUNITY

## 2018-01-29 RX ORDER — BISACODYL 10 MG
10 SUPPOSITORY, RECTAL RECTAL EVERY 24 HOURS
COMMUNITY
Start: 2016-12-06

## 2018-01-29 RX ORDER — ISOSORBIDE MONONITRATE 20 MG/1
20 TABLET ORAL
COMMUNITY
Start: 2016-12-06 | End: 2018-03-09 | Stop reason: SDUPTHER

## 2018-01-29 RX ORDER — CEPHALEXIN 500 MG/1
1 CAPSULE ORAL 3 TIMES DAILY
COMMUNITY
Start: 2018-01-23

## 2018-01-29 RX ORDER — BIOTIN 1 MG
TABLET ORAL
COMMUNITY

## 2018-01-29 RX ORDER — DIVALPROEX SODIUM 250 MG/1
250 TABLET, DELAYED RELEASE ORAL
COMMUNITY

## 2018-01-29 RX ORDER — GLIMEPIRIDE 4 MG/1
2 TABLET ORAL DAILY
COMMUNITY
Start: 2013-07-03

## 2018-01-29 RX ORDER — FERROUS SULFATE 325(65) MG
1 TABLET ORAL DAILY
COMMUNITY
Start: 2017-12-19

## 2018-01-29 RX ORDER — LISINOPRIL 20 MG/1
1 TABLET ORAL 2 TIMES DAILY
COMMUNITY

## 2018-01-29 RX ORDER — OMEPRAZOLE 20 MG/1
20 CAPSULE, DELAYED RELEASE ORAL
COMMUNITY

## 2018-01-30 ENCOUNTER — OFFICE VISIT (OUTPATIENT)
Dept: FAMILY MEDICINE CLINIC | Facility: CLINIC | Age: 83
End: 2018-01-30

## 2018-02-02 ENCOUNTER — TELEPHONE (OUTPATIENT)
Dept: FAMILY MEDICINE CLINIC | Facility: CLINIC | Age: 83
End: 2018-02-02

## 2018-02-02 NOTE — TELEPHONE ENCOUNTER
The facility called for an antibiotic for a bacteria Citrobacter  This was the results from the blood work that he had done in the hospital   He also had Klebsiella but antibiotics were called in and that is cleared up      [facility] pharmacy

## 2018-02-02 NOTE — PROGRESS NOTES
Called nursing home, [facility] [place], [place], and spoke with Sujey Means, patient's nurse  Informed nurse of +UTI  Patient was started on Keflex, which Klebsiella is susceptible to  Informed nursing staff of Citrobacter also  Nursing staff will inform patient's provider

## 2018-02-02 NOTE — TELEPHONE ENCOUNTER
The antibiotic used for the one infection may haver covered the 2nd infection  What abx was he taking after ER visit  Will re-address mon

## 2018-02-21 DIAGNOSIS — Z79.4 TYPE 2 DIABETES MELLITUS WITH COMPLICATION, WITH LONG-TERM CURRENT USE OF INSULIN (HCC): Primary | ICD-10-CM

## 2018-02-21 DIAGNOSIS — E11.8 TYPE 2 DIABETES MELLITUS WITH COMPLICATION, WITH LONG-TERM CURRENT USE OF INSULIN (HCC): Primary | ICD-10-CM

## 2018-02-21 RX ORDER — DIAPER,BRIEF,ADULT, DISPOSABLE
EACH MISCELLANEOUS
Qty: 50 EACH | Refills: 5 | Status: SHIPPED | OUTPATIENT
Start: 2018-02-21

## 2018-02-24 DIAGNOSIS — E11.69 TYPE 2 DIABETES MELLITUS WITH OTHER SPECIFIED COMPLICATION, WITH LONG-TERM CURRENT USE OF INSULIN (HCC): Primary | ICD-10-CM

## 2018-02-24 DIAGNOSIS — Z79.4 TYPE 2 DIABETES MELLITUS WITH OTHER SPECIFIED COMPLICATION, WITH LONG-TERM CURRENT USE OF INSULIN (HCC): Primary | ICD-10-CM

## 2018-03-09 DIAGNOSIS — F32.89 OTHER DEPRESSION: ICD-10-CM

## 2018-03-09 DIAGNOSIS — I25.118 CORONARY ARTERY DISEASE WITH STABLE ANGINA PECTORIS, UNSPECIFIED VESSEL OR LESION TYPE, UNSPECIFIED WHETHER NATIVE OR TRANSPLANTED HEART (HCC): Primary | ICD-10-CM

## 2018-03-09 DIAGNOSIS — I10 ESSENTIAL HYPERTENSION: Primary | ICD-10-CM

## 2018-03-09 DIAGNOSIS — E61.1 IRON DEFICIENCY: ICD-10-CM

## 2018-03-09 DIAGNOSIS — G89.29 OTHER CHRONIC PAIN: ICD-10-CM

## 2018-03-09 RX ORDER — GABAPENTIN 100 MG/1
CAPSULE ORAL
Qty: 84 CAPSULE | Refills: 0 | Status: SHIPPED | OUTPATIENT
Start: 2018-03-09

## 2018-03-09 RX ORDER — ESCITALOPRAM OXALATE 5 MG/1
TABLET ORAL
Qty: 28 TABLET | Refills: 0 | Status: SHIPPED | OUTPATIENT
Start: 2018-03-09

## 2018-03-09 RX ORDER — FERROUS SULFATE 325(65) MG
TABLET ORAL
Qty: 28 TABLET | Refills: 0 | Status: SHIPPED | OUTPATIENT
Start: 2018-03-09

## 2018-03-09 RX ORDER — LISINOPRIL 10 MG/1
TABLET ORAL
Qty: 56 TABLET | Refills: 0 | Status: SHIPPED | OUTPATIENT
Start: 2018-03-09

## 2018-03-09 RX ORDER — ISOSORBIDE MONONITRATE 20 MG/1
TABLET ORAL
Qty: 56 TABLET | Refills: 0 | Status: SHIPPED | OUTPATIENT
Start: 2018-03-09